# Patient Record
(demographics unavailable — no encounter records)

---

## 2024-10-11 NOTE — RESULTS/DATA
[TextEntry] : EKG shows sinus bradycardia at 56, R wave decay from V2-V3 (no significant change from 6/7/2024

## 2024-10-11 NOTE — HISTORY OF PRESENT ILLNESS
[FreeTextEntry1] : Patient was admitted to [place] 9/22/2023 with non-ST elevation MI, transferred to [facility] where he underwent cardiac cath showing 70% proximal LAD, 80% OM1, 100% thrombotic occlusion of OM 2, 60-70% proximal RCA, EF 55% ->underwent PCI of thrombotic occlusion of OM 2 with HERNÁN  Plan is to do stage angioplasty of the LAD/OM1  Has been having joint pain -. though not as bad as the atorvastatin  No CP Yesterday he went to his daughter's game -> felt winded with walking up a familiar hill Last night, he had wheezing while supine Otherwise, no lower extremity edema, PND orthopnea  BPs have 110s mostly over 70s  Records reviewed

## 2024-11-18 NOTE — HISTORY OF PRESENT ILLNESS
[FreeTextEntry1] : Since the last visit, patient underwent PCI of OM1 (the LAD was iFR neg) on 11/1/2024 at [facility].   He was discharged the following day with plan for staged PCI of the RCA (not injected as it was known to be diseased from 9/22/24)  Doing well, denying CP, SOB, palpitations or dizziness No SHAHID, PND or orthopnea  Cath from [facility] reviewed

## 2024-11-18 NOTE — HISTORY OF PRESENT ILLNESS
[FreeTextEntry1] : Since the last visit, patient underwent PCI of OM1 (the LAD was iFR neg) on 11/1/2024 at [facility].   He was discharged the following day with plan for staged PCI of the RCA (not injected as it was known to be diseased from 9/22/24)  Doing well, denying CP, SOB, palpitations or dizziness No SHAHID, PND or orthopnea  Cath from [facility] reviewed

## 2024-11-18 NOTE — RESULTS/DATA
[TextEntry] : EKG shows sinus rhythm at 61, IVCD, R wave decay from V2-V3 (compared to 9/22/2024, QRS is wider

## 2024-11-18 NOTE — REASON FOR VISIT
[Hyperlipidemia] : hyperlipidemia [Hypertension] : hypertension [Coronary Artery Disease] : coronary artery disease [FreeTextEntry3] : Dr Mtz

## 2024-11-18 NOTE — HISTORY OF PRESENT ILLNESS
[FreeTextEntry1] : Since the last visit, patient underwent PCI of OM1 (the LAD was iFR neg) on 11/1/2024 at [facility].   He was discharged the following day with plan for staged PCI of the RCA (not injected as it was known to be diseased from 9/22/24)  Doing well, denying CP, SOB, palpitations or dizziness No SHAHID, PND or orthopnea  Cath from [facility] reviewed

## 2025-01-03 NOTE — HISTORY OF PRESENT ILLNESS
[FreeTextEntry1] : Since the last visit, patient underwent cardiac cath at [facility] to specifically reassess the RCA lesion -> not significant by iFR.  Patient is doing well, walking every day without chest pain or shortness of breath  He gets what he describes as "a 3rd heartbeat", felt in the throat, on a daily basis.  He denies any dizziness  He is a bit anxious about knowing that he has these older lesions in his coronaries, albeit nonobstructive.  He wonders how he will know if he is in trouble.  We discussed these at length today

## 2025-04-03 NOTE — HISTORY OF PRESENT ILLNESS
[FreeTextEntry1] : Doing well -. No CP, SOB (except with strenuous activities).  No more palpitations and no dizziness Just came back from [place]  BPs ok at home  Exercising -. walking

## 2025-05-01 NOTE — PHYSICAL EXAM
[General Appearance - Alert] : alert [General Appearance - In No Acute Distress] : in no acute distress [Sclera] : the sclera and conjunctiva were normal [Extraocular Movements] : extraocular movements were intact [Outer Ear] : the ears and nose were normal in appearance [Hearing Threshold Finger Rub Not [place]] : hearing was normal [Neck Appearance] : the appearance of the neck was normal [] : no respiratory distress [Exaggerated Use Of Accessory Muscles For Inspiration] : no accessory muscle use [Apical Impulse] : the apical impulse was normal [Heart Rate And Rhythm] : heart rate was normal and rhythm regular [Edema] : there was no peripheral edema [Veins - Varicosity Changes] : there were no varicosital changes [Bowel Sounds] : normal bowel sounds [Abdomen Soft] : soft [No CVA Tenderness] : no ~M costovertebral angle tenderness [Abnormal Walk] : normal gait [Musculoskeletal - Swelling] : no joint swelling seen [Skin Color & Pigmentation] : normal skin color and pigmentation [Skin Turgor] : normal skin turgor [Cranial Nerves] : cranial nerves 2-12 were intact [Oriented To Time, Place, And Person] : oriented to person, place, and time [Impaired Insight] : insight and judgment were intact

## 2025-05-01 NOTE — HISTORY OF PRESENT ILLNESS
[FreeTextEntry1] : Pleasant 54 yo  with hxo f recurrent nephrolithaisis, developed large calculus L kidney, failed lithotripsy, underwent cysto with laser lithotripsy - returned 6 months later after routine US showed obstruction - was seen by Dr. Subramanian who advised him that his kidney was "dead" - sought second opinion same day and underwent urgent surgery to have stones removed - 39 stones removed - reports that NM scan showed ~7% fx  CAD - s/p MI 9/2024 - s/p 3 stents  9/2024, 11/2024, 12/2024  CKD - 2/2 solitary R kidney , extensive damage to L kidney 2/2 stones resulting in near total loss of renal fx ( per NM scan ~7%) - cont to have nephrolithiasis - drinks a gallon a day - last cr 12/2024 - 1.36 gfr 62  Nephrolithiasis - caox 24 hour litholink - elevated uric acid - acidic pH - low citrate levels  Obesity - suspect MARY

## 2025-05-01 NOTE — ASSESSMENT
[FreeTextEntry1] : CKD 3 - 2/2 near loss of fx in L kidney from calculi, repeated contrast studies, borderline dm  - litholink reviewed, imaging reviewed,  - encourage 2-3 liters fluids/day - start allopurinol for hyperuricosuria - start moonstone for acidic urine/low citrate - focus on weightloss - B P control  HTN - cont current regimen, amlodipine, MTP, valsartan  Obesity - start zepbound -underlying CAD,  MARY - suspected MARY - habitus, snores, htn, cad - order sleep study  CAD - followed by cards - rec have children screened given that he is only 55  Hyperuricosuria -  oin low purine diet, see dietician - start allopurinol  > 60 min spent face to face reviewi`casper chart, imaging, hospital records, op reports, ekg, cardiology and urology notes, litholink and labs, and discussing management of CKD< nephrolithiasis, htn, mary and obesity

## 2025-05-01 NOTE — REASON FOR VISIT
[Initial Evaluation] : an initial evaluation [FreeTextEntry1] : establish care , solitary kidney/s/p NHx L, nephrolithiasis

## 2025-07-09 NOTE — ASSESSMENT
[FreeTextEntry1] : CKD 3 - 2/2 near loss of fx in L kidney from calculi, repeated contrast studies, borderline dm  - litholink reviewed, imaging reviewed,  - encourage 2-3 liters fluids/day - start allopurinol for hyperuricosuria - start moonstone for acidic urine/low citrate - focus on weightloss - BP control  HTN - cont current regimen, amlodipine, MTP, valsartan  Obesity - zepbound, denied - underlying CAD,  MARY - suspected MARY - habitus, snores, htn, cad - awaiting sleep study - [facility] for August  CAD - followed by cards - rec have children screened given that he is only 55  Hyperuricosuria -  on low purine diet, see dietician - cont allopurinol

## 2025-07-09 NOTE — HISTORY OF PRESENT ILLNESS
[FreeTextEntry1] : Pleasant 56 yo  with hxo f recurrent nephrolithaisis, developed large calculus L kidney, failed lithotripsy, underwent cysto with laser lithotripsy - returned 6 months later after routine US showed obstruction - was seen by Dr. Subramanian who advised him that his kidney was "dead" - sought second opinion same day and underwent urgent surgery to have stones removed - 39 stones removed - reports that NM scan showed ~7% fx  CAD - s/p MI 9/2024 - s/p 3 stents  9/2024, 11/2024, 12/2024  CKD - 2/2 solitary R kidney , extensive damage to L kidney 2/2 stones resulting in near total loss of renal fx ( per NM scan ~7%) - cont to have nephrolithiasis - drinks a gallon a day - last cr 12/2024 - 1.36 gfr 62  Nephrolithiasis - caox 24 hour litholink - elevated uric acid - acidic pH - low citrate levels  Obesity - suspect MARY

## 2025-07-09 NOTE — PHYSICAL EXAM
[General Appearance - Alert] : alert [General Appearance - In No Acute Distress] : in no acute distress [Sclera] : the sclera and conjunctiva were normal [Extraocular Movements] : extraocular movements were intact [Outer Ear] : the ears and nose were normal in appearance [Hearing Threshold Finger Rub Not [place]] : hearing was normal [Neck Appearance] : the appearance of the neck was normal [] : no respiratory distress [Exaggerated Use Of Accessory Muscles For Inspiration] : no accessory muscle use [Apical Impulse] : the apical impulse was normal [Heart Rate And Rhythm] : heart rate was normal and rhythm regular [Edema] : there was no peripheral edema [Veins - Varicosity Changes] : there were no varicosital changes [Bowel Sounds] : normal bowel sounds [Abdomen Soft] : soft [No CVA Tenderness] : no ~M costovertebral angle tenderness [Abnormal Walk] : normal gait [Musculoskeletal - Swelling] : no joint swelling seen [Skin Color & Pigmentation] : normal skin color and pigmentation [Skin Turgor] : normal skin turgor [Cranial Nerves] : cranial nerves 2-12 were intact [Oriented To Time, Place, And Person] : oriented to person, place, and time [Impaired Insight] : insight and judgment were intact